# Patient Record
Sex: MALE | Race: WHITE | Employment: FULL TIME | URBAN - METROPOLITAN AREA
[De-identification: names, ages, dates, MRNs, and addresses within clinical notes are randomized per-mention and may not be internally consistent; named-entity substitution may affect disease eponyms.]

---

## 2024-07-28 ENCOUNTER — HOSPITAL ENCOUNTER (EMERGENCY)
Age: 49
Discharge: HOME | End: 2024-07-28
Attending: EMERGENCY MEDICINE

## 2024-07-28 VITALS
HEART RATE: 78 BPM | TEMPERATURE: 98.7 F | BODY MASS INDEX: 37.58 KG/M2 | OXYGEN SATURATION: 98 % | SYSTOLIC BLOOD PRESSURE: 127 MMHG | WEIGHT: 302.2 LBS | RESPIRATION RATE: 17 BRPM | DIASTOLIC BLOOD PRESSURE: 73 MMHG | HEIGHT: 75 IN

## 2024-07-28 DIAGNOSIS — R10.13 EPIGASTRIC PAIN: Primary | ICD-10-CM

## 2024-07-28 LAB
ALBUMIN SERPL-MCNC: 4 G/DL (ref 3.4–5)
ALP SERPL-CCNC: 79 U/L (ref 40–129)
ALT SERPL-CCNC: 23 U/L (ref 10–40)
ANION GAP SERPL CALCULATED.3IONS-SCNC: 15 MMOL/L (ref 3–16)
AST SERPL-CCNC: 26 U/L (ref 15–37)
BASOPHILS # BLD: 0.1 K/UL (ref 0–0.2)
BASOPHILS NFR BLD: 0.9 %
BILIRUB DIRECT SERPL-MCNC: <0.2 MG/DL (ref 0–0.3)
BILIRUB INDIRECT SERPL-MCNC: NORMAL MG/DL (ref 0–1)
BILIRUB SERPL-MCNC: 0.6 MG/DL (ref 0–1)
BUN SERPL-MCNC: 10 MG/DL (ref 7–20)
CA-I BLD-SCNC: 1.12 MMOL/L (ref 1.12–1.32)
CALCIUM SERPL-MCNC: 9 MG/DL (ref 8.3–10.6)
CHLORIDE SERPL-SCNC: 103 MMOL/L (ref 99–110)
CO2 SERPL-SCNC: 21 MMOL/L (ref 21–32)
CREAT SERPL-MCNC: 0.9 MG/DL (ref 0.9–1.3)
DEPRECATED RDW RBC AUTO: 15.7 % (ref 12.4–15.4)
EOSINOPHIL # BLD: 0.2 K/UL (ref 0–0.6)
EOSINOPHIL NFR BLD: 4 %
GFR SERPLBLD CREATININE-BSD FMLA CKD-EPI: >90 ML/MIN/{1.73_M2}
GLUCOSE SERPL-MCNC: 99 MG/DL (ref 70–99)
HCT VFR BLD AUTO: 40 % (ref 40.5–52.5)
HGB BLD-MCNC: 13 G/DL (ref 13.5–17.5)
LACTATE BLDV-SCNC: 0.8 MMOL/L (ref 0.4–2)
LIPASE SERPL-CCNC: 82 U/L (ref 13–60)
LYMPHOCYTES # BLD: 1.4 K/UL (ref 1–5.1)
LYMPHOCYTES NFR BLD: 22.3 %
MCH RBC QN AUTO: 28.8 PG (ref 26–34)
MCHC RBC AUTO-ENTMCNC: 32.6 G/DL (ref 31–36)
MCV RBC AUTO: 88.5 FL (ref 80–100)
MONOCYTES # BLD: 0.7 K/UL (ref 0–1.3)
MONOCYTES NFR BLD: 11.5 %
NEUTROPHILS # BLD: 3.8 K/UL (ref 1.7–7.7)
NEUTROPHILS NFR BLD: 61.3 %
PH VENOUS: 7.39 (ref 7.35–7.45)
PLATELET # BLD AUTO: ABNORMAL K/UL (ref 135–450)
PMV BLD AUTO: ABNORMAL FL (ref 5–10.5)
POTASSIUM SERPL-SCNC: 3.9 MMOL/L (ref 3.5–5.1)
PROT SERPL-MCNC: 6.6 G/DL (ref 6.4–8.2)
RBC # BLD AUTO: 4.51 M/UL (ref 4.2–5.9)
SODIUM SERPL-SCNC: 139 MMOL/L (ref 136–145)
WBC # BLD AUTO: 6.1 K/UL (ref 4–11)

## 2024-07-28 PROCEDURE — 96375 TX/PRO/DX INJ NEW DRUG ADDON: CPT

## 2024-07-28 PROCEDURE — 83690 ASSAY OF LIPASE: CPT

## 2024-07-28 PROCEDURE — 82330 ASSAY OF CALCIUM: CPT

## 2024-07-28 PROCEDURE — 6360000002 HC RX W HCPCS

## 2024-07-28 PROCEDURE — 2580000003 HC RX 258

## 2024-07-28 PROCEDURE — 85025 COMPLETE CBC W/AUTO DIFF WBC: CPT

## 2024-07-28 PROCEDURE — 96374 THER/PROPH/DIAG INJ IV PUSH: CPT

## 2024-07-28 PROCEDURE — 84478 ASSAY OF TRIGLYCERIDES: CPT

## 2024-07-28 PROCEDURE — 96376 TX/PRO/DX INJ SAME DRUG ADON: CPT

## 2024-07-28 PROCEDURE — 80076 HEPATIC FUNCTION PANEL: CPT

## 2024-07-28 PROCEDURE — 80048 BASIC METABOLIC PNL TOTAL CA: CPT

## 2024-07-28 PROCEDURE — 83605 ASSAY OF LACTIC ACID: CPT

## 2024-07-28 PROCEDURE — 99284 EMERGENCY DEPT VISIT MOD MDM: CPT

## 2024-07-28 RX ORDER — HYDROMORPHONE HYDROCHLORIDE 1 MG/ML
1 INJECTION, SOLUTION INTRAMUSCULAR; INTRAVENOUS; SUBCUTANEOUS ONCE
Status: COMPLETED | OUTPATIENT
Start: 2024-07-28 | End: 2024-07-28

## 2024-07-28 RX ORDER — ONDANSETRON 4 MG/1
4 TABLET, ORALLY DISINTEGRATING ORAL 3 TIMES DAILY PRN
Qty: 21 TABLET | Refills: 0 | Status: SHIPPED | OUTPATIENT
Start: 2024-07-28

## 2024-07-28 RX ORDER — SIMVASTATIN 40 MG
40 TABLET ORAL NIGHTLY
COMMUNITY

## 2024-07-28 RX ORDER — ONDANSETRON 2 MG/ML
4 INJECTION INTRAMUSCULAR; INTRAVENOUS ONCE
Status: COMPLETED | OUTPATIENT
Start: 2024-07-28 | End: 2024-07-28

## 2024-07-28 RX ORDER — SODIUM CHLORIDE, SODIUM LACTATE, POTASSIUM CHLORIDE, AND CALCIUM CHLORIDE .6; .31; .03; .02 G/100ML; G/100ML; G/100ML; G/100ML
500 INJECTION, SOLUTION INTRAVENOUS ONCE
Status: COMPLETED | OUTPATIENT
Start: 2024-07-28 | End: 2024-07-28

## 2024-07-28 RX ORDER — FENOFIBRATE 145 MG/1
145 TABLET, COATED ORAL DAILY
COMMUNITY

## 2024-07-28 RX ORDER — ONDANSETRON 2 MG/ML
8 INJECTION INTRAMUSCULAR; INTRAVENOUS ONCE
Status: COMPLETED | OUTPATIENT
Start: 2024-07-28 | End: 2024-07-28

## 2024-07-28 RX ADMIN — HYDROMORPHONE HYDROCHLORIDE 1 MG: 1 INJECTION, SOLUTION INTRAMUSCULAR; INTRAVENOUS; SUBCUTANEOUS at 15:30

## 2024-07-28 RX ADMIN — SODIUM CHLORIDE, POTASSIUM CHLORIDE, SODIUM LACTATE AND CALCIUM CHLORIDE 500 ML: 600; 310; 30; 20 INJECTION, SOLUTION INTRAVENOUS at 15:31

## 2024-07-28 RX ADMIN — ONDANSETRON 4 MG: 2 INJECTION INTRAMUSCULAR; INTRAVENOUS at 18:50

## 2024-07-28 RX ADMIN — HYDROMORPHONE HYDROCHLORIDE 1 MG: 1 INJECTION, SOLUTION INTRAMUSCULAR; INTRAVENOUS; SUBCUTANEOUS at 20:32

## 2024-07-28 RX ADMIN — HYDROMORPHONE HYDROCHLORIDE 1 MG: 1 INJECTION, SOLUTION INTRAMUSCULAR; INTRAVENOUS; SUBCUTANEOUS at 18:50

## 2024-07-28 RX ADMIN — HYDROMORPHONE HYDROCHLORIDE 1 MG: 1 INJECTION, SOLUTION INTRAMUSCULAR; INTRAVENOUS; SUBCUTANEOUS at 16:25

## 2024-07-28 RX ADMIN — ONDANSETRON 8 MG: 2 INJECTION INTRAMUSCULAR; INTRAVENOUS at 15:30

## 2024-07-28 ASSESSMENT — PAIN DESCRIPTION - DESCRIPTORS
DESCRIPTORS: BURNING;NAGGING
DESCRIPTORS: PENETRATING
DESCRIPTORS: PENETRATING

## 2024-07-28 ASSESSMENT — PAIN - FUNCTIONAL ASSESSMENT
PAIN_FUNCTIONAL_ASSESSMENT: ACTIVITIES ARE NOT PREVENTED
PAIN_FUNCTIONAL_ASSESSMENT: 0-10

## 2024-07-28 ASSESSMENT — PAIN DESCRIPTION - ORIENTATION: ORIENTATION: MID

## 2024-07-28 ASSESSMENT — PAIN SCALES - GENERAL
PAINLEVEL_OUTOF10: 10
PAINLEVEL_OUTOF10: 9
PAINLEVEL_OUTOF10: 8
PAINLEVEL_OUTOF10: 6
PAINLEVEL_OUTOF10: 9

## 2024-07-28 ASSESSMENT — PAIN DESCRIPTION - FREQUENCY: FREQUENCY: CONTINUOUS

## 2024-07-28 ASSESSMENT — PAIN DESCRIPTION - LOCATION
LOCATION: ABDOMEN

## 2024-07-28 ASSESSMENT — LIFESTYLE VARIABLES
HOW MANY STANDARD DRINKS CONTAINING ALCOHOL DO YOU HAVE ON A TYPICAL DAY: PATIENT DOES NOT DRINK
HOW OFTEN DO YOU HAVE A DRINK CONTAINING ALCOHOL: NEVER

## 2024-07-28 ASSESSMENT — PAIN DESCRIPTION - ONSET: ONSET: AWAKENED FROM SLEEP

## 2024-07-28 ASSESSMENT — PAIN DESCRIPTION - PAIN TYPE: TYPE: ACUTE PAIN

## 2024-07-28 NOTE — ED PROVIDER NOTES
ED Attending Attestation Note     Date of evaluation: 7/28/2024    This patient was seen by the resident.  I have seen and examined the patient, agree with the workup, evaluation, management and diagnosis. The care plan has been discussed.  I have reviewed the ECG and concur with the resident's interpretation.      Briefly, Steven Case is a 49 y.o. male with a PMH inclusive of hypertriglyceridemia, recurrent pancreatitis who presents for evaluation of upper abdominal pain. Does not drink alcohol. S/p cholecystectomy.  States this feels exactly like prior episodes of pancreatitis.    Notable exam findings include upper abdominal tenderness.    Assessment/ Medical Decision Making:     Patient has had multiple episodes of pancreatitis previously.  States this feels identical to prior. He states he is typically able to get pain medicine and be discharged home.  Will check labs, treat symptomatically, reassess.  Dispo pending the above       Elsa Grimes MD  07/28/24 1041

## 2024-07-28 NOTE — ED PROVIDER NOTES
THE [facility]  EMERGENCY DEPARTMENT ENCOUNTER          EM RESIDENT NOTE       Date of evaluation: 7/28/2024    Chief Complaint     Abdominal Pain (\"Pancreatitis from triglycerides\"/)      History of Present Illness     Steven Case is a 49 y.o. male with history of chronic/recurrent pancreatitis hypertriglyceridemia who presents with upper abdominal pain and nausea/vomiting.  Notes significant and worsening upper abdominal pain since waking up this morning and 2 episodes of nonbloody emesis earlier today.  States his last pancreatitis flare was in March however this current pain and nausea feels exactly like his previous flares.  He denies fever, chills, diarrhea, chest pain, shortness of breath.  He states that his typical flares usually require IV pain medicine and antiemetics and that he is typically discharged home.  He states that he has not had to have an admission for pancreatitis in several years.  Gets most of his care in [place] where he is from but he is here currently visiting.     MEDICAL DECISION MAKING / ASSESSMENT / PLAN     INITIAL VITALS: BP: (!) 147/96, Temp: 98.7 °F (37.1 °C), Pulse: 80, Respirations: 20, SpO2: 98 %    Steven Case is a 49 y.o. male with past medical history of recurrent pancreatitis secondary to hypertriglyceridemia presenting with signs and symptoms most consistent with his typical pancreatitis flare. Exam notable for upper abdominal tenderness without significant involuntary rebound or guarding.  Otherwise well-appearing patient with mild tachycardia likely secondary to pain as patient obviously uncomfortable and hunched over.  Laboratory evaluation reassuring with a lactate of 0.8 and a ionized calcium of 1.12.  Lipase slightly elevated only at 82 and a normal hepatic panel, normal BMP, normal CBC.  Reassured against other infectious causes of abdominal pain with his exam, history, and overall reassuring laboratory workup.  Given his normal calcium and lactate as

## 2024-07-29 LAB — TRIGL SERPL-MCNC: 209 MG/DL (ref 0–150)

## 2024-07-29 NOTE — ED PROVIDER NOTES
THE [facility]  EMERGENCY DEPARTMENT ENCOUNTER          EM RESIDENT NOTE     Date of evaluation: 7/28/2024    MEDICAL DECISION MAKING / ASSESSMENT / PLAN       Steven Case is a 49 y.o. male who presented to the emergency department on 7/28/2024.  Please see the original provider's note for details regarding the initial history, physical exam and ED course.        Patient Summary & ED Course After Hand Off::  ED Course as of 07/28/24 2021   Sun Jul 28, 2024 1913 I am assuming care of this patient from an off-going provider. Briefly, this is a patient with chronic pancreatitis. Now stable.    The following items are pending in the patient's workup, treatment, and disposition:  Reassess. Po Challenge.       I will perform a clinical reassessment and determine ultimate disposition based on my evaluation.  [AB]      ED Course User Index  [AB] Jean Kaiser MD     Patient pain now well-controlled. Tolerating PO intake. Feels able to go home.     At this time, the patient was deemed appropriate for discharge. My customary discharge instructions, including strict return precautions for new or worsening symptoms concerning to the patient, were provided. All of patient's questions were answered satisfactorily, and he was subsequently sent home in stable condition.      Medical Decision Making  Problems Addressed:  Epigastric pain: complicated acute illness or injury    Amount and/or Complexity of Data Reviewed  Labs: ordered.    Risk  Prescription drug management.          This patient was also evaluated by the attending physician. All care plans were discussed and agreed upon.    Clinical Impression     1. Epigastric pain        Disposition     PATIENT REFERRED TO:  No follow-up provider specified.    DISCHARGE MEDICATIONS:  New Prescriptions    No medications on file       DISPOSITION Decision To Discharge 07/28/2024 08:19:57 PM        Diagnostic Results and Other Data     RADIOLOGY:  No orders to display

## 2024-07-29 NOTE — DISCHARGE INSTRUCTIONS
Thank you for allowing us take part in your care.      You should follow-up with your regular doctor.  If you do not have please follow-up with our community outpatient clinic: [phone] . Talk to our  if you have more questions.    You should return to the emergency department if your symptoms worsen or do not resolve. In addition, return if:  - You have a fever (greater than 101 degrees)  - You have chest pain, shortness of breath, abdominal pain, or uncontrollable vomiting  - You are unable to eat or drink  - You pass out  - You have difficulty moving your arms or legs   - You have difficulty speaking or slurred speech  - Or you have any concern that you feel needs acute physician evaluation.